# Patient Record
(demographics unavailable — no encounter records)

---

## 2025-04-21 NOTE — DATA REVIEWED
[de-identified] : Audiogram personally reviewed and interpreted and my findings are as follows (4/21/25): Right: SRT 35dB; WRS 76%; Type A tymp; mild/mod down to severe SNHL Left: SRT 95dB; WRS CNT%; Type A tymp; profound SNHL

## 2025-04-21 NOTE — HISTORY OF PRESENT ILLNESS
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 His right ear also has hearing loss that is chronic and progressively getting worse. He has bilateral nonpulsatile tinnitus. No otalgia, otorrhea, vertigo, acute hearing changes. No hx of ear surgery nor ear infections. No other complaints. He is wondering about a cochlear implant.

## 2025-04-21 NOTE — ASSESSMENT
[FreeTextEntry1] : Bilateral SNHL - 1 chronic illness with progression - L profound SNHL for 17 years  - R mild/mod down to severe SNHL - recommend Bicros - audiogram reviewed with patient - hearing aid eval - f/u as needed  Brittany  Kathryn #[id number]